# Patient Record
Sex: FEMALE | Race: WHITE
[De-identification: names, ages, dates, MRNs, and addresses within clinical notes are randomized per-mention and may not be internally consistent; named-entity substitution may affect disease eponyms.]

---

## 2020-09-22 ENCOUNTER — HOSPITAL ENCOUNTER (OUTPATIENT)
Dept: HOSPITAL 46 - OPS | Age: 65
Discharge: HOME | End: 2020-09-22
Attending: SURGERY
Payer: MEDICARE

## 2020-09-22 VITALS — WEIGHT: 289 LBS | HEIGHT: 68 IN | BODY MASS INDEX: 43.8 KG/M2

## 2020-09-22 DIAGNOSIS — K57.30: ICD-10-CM

## 2020-09-22 DIAGNOSIS — Z79.82: ICD-10-CM

## 2020-09-22 DIAGNOSIS — E78.5: ICD-10-CM

## 2020-09-22 DIAGNOSIS — E03.9: ICD-10-CM

## 2020-09-22 DIAGNOSIS — K64.8: ICD-10-CM

## 2020-09-22 DIAGNOSIS — K62.1: Primary | ICD-10-CM

## 2020-09-22 DIAGNOSIS — Z86.010: ICD-10-CM

## 2020-09-22 DIAGNOSIS — E55.9: ICD-10-CM

## 2020-09-22 DIAGNOSIS — Z79.899: ICD-10-CM

## 2020-09-22 DIAGNOSIS — E66.9: ICD-10-CM

## 2020-09-22 DIAGNOSIS — I10: ICD-10-CM

## 2020-09-22 PROCEDURE — G0500 MOD SEDAT ENDO SERVICE >5YRS: HCPCS

## 2020-09-22 PROCEDURE — 0DBK8ZZ EXCISION OF ASCENDING COLON, VIA NATURAL OR ARTIFICIAL OPENING ENDOSCOPIC: ICD-10-PCS | Performed by: SURGERY

## 2020-09-22 PROCEDURE — 0DBP8ZZ EXCISION OF RECTUM, VIA NATURAL OR ARTIFICIAL OPENING ENDOSCOPIC: ICD-10-PCS | Performed by: SURGERY

## 2020-09-22 NOTE — NUR
PT ALERT, ORIENTED AND SEEMED TO BE STRESSING SOMEWHAT GETTING INTO BED. PT
MENTIONED THAT SHE HAD SCOPE 10 YRS AGO,  GAVE ENCOURAGEMENT-RN BRITT IN TO
PREP PT. GAVE BLESSING, WILL FOLLOW

## 2020-09-22 NOTE — NUR
09/22/20 Rhina Randolph
1033-PATIENT ARRIVED TO PACU ON 2L NC AWAKE DROWSY DENIES PAIN OR
NAUSEA. LAYING LEFT LATERAL. ABDOMEN SOFT ENCOURAGED TO PASS GAS. IVF
INFUSING. HOB ELEVATED.

## 2020-09-23 NOTE — OR
[facility]
                                    [address], [place]  [address]
_________________________________________________________________________________________
                                                                 Signed   
 
 
DATE OF OPERATION:
09/22/2020
 
SURGEON:
Kelly Viveros MD
 
PREOPERATIVE DIAGNOSES:
1. Possible history of colonic polyps in her mother.
2. Hyperplastic colonic polyps in 2010.
3. Internal and external hemorrhoids.
 
POSTOPERATIVE DIAGNOSES:
1. A 4 mm polyp, proximal right colon (in the ileocecal valve).
2. A 5 mm polyp at 10 cm/rectum.
3. Minimal-to-moderate sigmoid diverticulosis.
 
PROCEDURE:
Colonoscopy with hot biopsy.
 
ESTIMATED BLOOD LOSS:
None.
 
INDICATIONS:
Tawana is a 65-year-old lady, asked to see me for followup colonoscopy.  She nor her
mother can specifically remember the history of colonic polyps in her mother; however,
it is in her notes.  She has moved up to [place] to be with her mom.  She told
me her mom has been for colonoscopy in quite a few years.  Tawana had hyperplastic polyps
removed back in 2010.  She describes internal and external hemorrhoids for years.  She
has had Preparation H or Proctosol cream works out quite well, otherwise no lower GI
complaints.  In the office, I gave her a pamphlet on colonoscopy.  We looked at that
together along with the risks including, but not limited to gas bloating, crampy
abdominal pain, bleeding, perforation requiring surgery, and missed diagnosis.  She also
understands the need for IV conscious sedation.  She had expressed understanding and
wished to proceed. 
 
DESCRIPTION OF PROCEDURE:
Tawana was taken into our endoscopy suite and placed in the left lateral decubitus
position.  She was given a total of 150 mcg of fentanyl and 6 mg of Versed.  A digital
rectal exam had been performed, not much really in the way of external hemorrhoids.
Good sphincter tone.  The adult colonoscope was introduced and advanced all around into
the cecum under direct visualization of camera.  It took some extra sedation and
abdominal compression in order to advance the scope.  We could easily see the
 
    Electronically Signed By: KELLY VIVEROS MD  09/23/20 1011
_________________________________________________________________________________________
PATIENT NAME:     TAWANA SALGUERO              
MEDICAL RECORD #: [id number]            OPERATIVE REPORT              
          ACCT #: [id number]  
DATE OF BIRTH:   [birth year]            REPORT #: [id number]      
PHYSICIAN:        KELLY VIVEROS MD             
PCP:              TONG STEVENS PAC       
REPORT IS CONFIDENTIAL AND NOT TO BE RELEASED WITHOUT AUTHORIZATION
 
 
                                  [facility]
                                    [address], [place]  [address]
_________________________________________________________________________________________
                                                                 Signed   
 
 
appendiceal orifice and the ileocecal valve.  Just to the side of the ileocecal valve,
there is a very tiny polypoid lesion.  We went ahead and removed it with the help of our
hot biopsy forceps.  Her prep was quite excellent.  We took pictures throughout for
photodocumentation.  She does have moderate diverticula in the sigmoid colon.  They were
minimal-to-moderate in number, moderate in size and scattered about.  At 10 cm in the
rectum was also a tiny polyp removed with a hot biopsy forceps.  Upon retroflexion of
scope, we could see scar polypectomy site just above the anal canal.  She has some very
tiny internal anal skin tags, really not much in the way of internal hemorrhoids.  After
this, the gas was suctioned out.  The colonoscope removed.  Tawana tolerated the procedure
quite well. 
 
RECOMMENDATIONS:
I will see Tawana back in my office in 7 to 14 days to review her results.  She may ends
up more than likely she will stay on the 10-year rotation. 
 
 
 
            ________________________________________
            Kelly Viveros MD 
 
 
ALB/MODL
Job #:  [id number]
DD:  09/22/2020 10:41:09
DT:  09/22/2020 11:19:52
 
cc:            Kelly Viveros MD
 
 
Copies:  KELLY VIVEROS MD
~
 
 
 
 
 
 
 
 
 
 
 
 
    Electronically Signed By: KELLY VIVEROS MD  09/23/20 1011
_________________________________________________________________________________________
PATIENT NAME:     TAWANA SALGUERO              
MEDICAL RECORD #: [id number]            OPERATIVE REPORT              
          ACCT #: [id number]  
DATE OF BIRTH:   [birth year]            REPORT #: [id number]      
PHYSICIAN:        KELLY VIVEROS MD             
PCP:              TONG STEVENS PAC       
REPORT IS CONFIDENTIAL AND NOT TO BE RELEASED WITHOUT AUTHORIZATION

## 2020-09-29 NOTE — PATH
[facility]
                                    [address], [place]  [address]
_________________________________________________________________________________________
                                                                 Signed   
 
 
 
SPECIMEN(S): A COLON POLYP AT 10 CM
SPECIMEN(S): B PROXIMAL ASCENDING POLYP
 
SPECIMEN SOURCE:
A. COLON POLYP AT 10 CM
B. PROXIMAL ASCENDING POLYP
 
CLINICAL HISTORY:
Colonoscopy.  History of polyp.  Postop: Rectal polyp, colon polyp, 
diverticulosis. 
MICROSCOPIC DESCRIPTION:
Histologic sections of all submitted blocks are examined by light microscopy. 
These findings, together with the gross examination, support the pathologic 
diagnosis. 
 
FINAL PATHOLOGIC DIAGNOSIS:
A.  Colon, polyp at 10 cm, polypectomy:
-  Hyperplastic polyp.
-  Negative for dysplasia or malignancy.
B.  Colon, proximal ascending, polyp, polypectomy:
-  Colonic mucosa with no histopathologic abnormality.
-  Negative for dysplasia or malignancy.
NAL:vlg:C2NR
 
GROSS DESCRIPTION:
Two specimens are received in two containers, labeled "RR."
A.  The specimen, labeled "RR, colon polyp at 10 cm," is received in formalin 
and consists of two tan soft tissue fragments that measure 0.3 cm in greatest 
dimension.  The specimen is entirely 
submitted in cassette (A1).
B.  The specimen, labeled "RR, proximal ascending colon polyp," is received in 
formalin and consists of one tan soft tissue fragment that measures 0.2 cm in 
greatest dimension.  The specimen is 
entirely submitted in cassette (B1).
JS (under the direct supervision of a pathologist)
The Gross Description was prepared using a voice recognition system.  The 
report was reviewed for accuracy; however, sound-alike word errors, addition 
and/or deletions may occur.  If there is any 
question about this report, please contact Client Services.
 
PERFORMING LABORATORY:
 
                                                                                    
_________________________________________________________________________________________
PATIENT NAME:     TAWANA SALGUERO              
MEDICAL RECORD #: [id number]            PATHOLOGY                     
          ACCT #: [id number]       ACCESSION #: [id number]     
DATE OF BIRTH:   [birth year]            REPORT #: [id number]       
PHYSICIAN:        VIMAL PENA              
PCP:              TONG STEVENS PAC       
REPORT IS CONFIDENTIAL AND NOT TO BE RELEASED WITHOUT AUTHORIZATION
 
 
                                  [facility]
                                    [address] [address]
_________________________________________________________________________________________
                                                                 Signed   
 
 
The technical component was performed [place] [address] (Medical Director: Jes Villavicencio MD; CLIA# [id number]).  
Professional interpretation was performed by 
[facility] [address] [address] (CLIA# [id number]). 
 
Diagnostician:  Sharlene Santa MD
Pathologist
Electronically Signed 09/24/2020
 
 
Copies:                                
~
 
 
 
 
 
 
 
 
 
 
 
 
 
 
 
 
 
 
 
 
 
 
 
 
 
 
 
 
 
 
                                                                                    
_________________________________________________________________________________________
PATIENT NAME:     TAWANA SALGUERO              
MEDICAL RECORD #: [id number]            PATHOLOGY                     
          ACCT #: [id number]       ACCESSION #: [id number]     
DATE OF BIRTH:   [birth year]            REPORT #: [id number]       
PHYSICIAN:        VIMAL PENA              
PCP:              TONG STEVENS PAC       
REPORT IS CONFIDENTIAL AND NOT TO BE RELEASED WITHOUT AUTHORIZATION